# Patient Record
Sex: FEMALE | URBAN - METROPOLITAN AREA
[De-identification: names, ages, dates, MRNs, and addresses within clinical notes are randomized per-mention and may not be internally consistent; named-entity substitution may affect disease eponyms.]

---

## 2023-06-14 ENCOUNTER — APPOINTMENT (RX ONLY)
Dept: URBAN - METROPOLITAN AREA CLINIC 339 | Facility: CLINIC | Age: 38
Setting detail: DERMATOLOGY
End: 2023-06-14

## 2023-06-14 DIAGNOSIS — L24 IRRITANT CONTACT DERMATITIS: ICD-10-CM

## 2023-06-14 PROBLEM — L24.9 IRRITANT CONTACT DERMATITIS, UNSPECIFIED CAUSE: Status: ACTIVE | Noted: 2023-06-14

## 2023-06-14 PROCEDURE — ? MEDICATION COUNSELING

## 2023-06-14 PROCEDURE — ? PRESCRIPTION

## 2023-06-14 PROCEDURE — 99204 OFFICE O/P NEW MOD 45 MIN: CPT

## 2023-06-14 PROCEDURE — ? COUNSELING

## 2023-06-14 PROCEDURE — ? ADDITIONAL NOTES

## 2023-06-14 PROCEDURE — ? PRESCRIPTION MEDICATION MANAGEMENT

## 2023-06-14 RX ORDER — TRIAMCINOLONE ACETONIDE 1 MG/G
CREAM TOPICAL BID
Qty: 80 | Refills: 1 | Status: ERX | COMMUNITY
Start: 2023-06-14

## 2023-06-14 RX ADMIN — TRIAMCINOLONE ACETONIDE: 1 CREAM TOPICAL at 00:00

## 2023-06-14 ASSESSMENT — LOCATION SIMPLE DESCRIPTION DERM
LOCATION SIMPLE: LEFT ANTERIOR NECK
LOCATION SIMPLE: RIGHT ANTERIOR NECK
LOCATION SIMPLE: RIGHT FOREARM
LOCATION SIMPLE: LEFT FOREARM

## 2023-06-14 ASSESSMENT — LOCATION DETAILED DESCRIPTION DERM
LOCATION DETAILED: LEFT INFERIOR LATERAL NECK
LOCATION DETAILED: RIGHT INFERIOR LATERAL NECK
LOCATION DETAILED: RIGHT DISTAL DORSAL FOREARM
LOCATION DETAILED: LEFT DISTAL DORSAL FOREARM

## 2023-06-14 ASSESSMENT — LOCATION ZONE DERM
LOCATION ZONE: NECK
LOCATION ZONE: ARM

## 2023-06-14 ASSESSMENT — BSA RASH: BSA RASH: 2

## 2023-06-14 ASSESSMENT — ITCH NUMERIC RATING SCALE: HOW SEVERE IS YOUR ITCHING?: 4

## 2023-06-14 NOTE — PROCEDURE: MEDICATION COUNSELING
DISCHARGE SUMMARY    PRINCIPAL ADMITTED DIAGNOSIS:total abdominal hysterectomy, retropubic mid urethral sling    PRINCIPAL DISCHARGE DIAGNOSIS: total abdominal hysterectomy , retropubic mid urethral sling, incidental appendectomy    REASON FOR ADMISSION: Patient admitted for above surgical proceedure    Past Medical History:   Diagnosis Date     Abnormal maternal glucose tolerance, complicating pregnancy, childbirth, or the puerperium, unspecified as to episode of care      Allergic rhinitis, cause unspecified     Allergic rhinitis     Anxiety      Attention deficit disorder without mention of hyperactivity     psychiatrist     Breast cancer (H)     left     Depression      Essential hypertension      Insomnia, unspecified      Obesity      Type 2 diabetes mellitus without complication (H)           Additional diagnoses and complications which pertain to this admission: none     Operative procedures: total abdominal hysterectomy, retropubic mid urethral sling, appendectomy    SIGNIFICANT FINDINGS:  none   Hemoglobin   Date Value Ref Range Status   01/29/2021 12.9 11.7 - 15.7 g/dL Final       HOSPITAL COURSE:  Routine post operative course.  Pain management adequate, discharge on post op day #3    Diet: Regular     Activity: lifting restricted to 10 lbs, nothing in the vagina for 6 weeks    DISCHARGE INSTRUCTIONS:   [unfilled]    Follow-up: in 2 weeks     Condition at Discharge:    Stable    Total time spent on discharge: 20 minutes    Chelsi Anderson MD  1/31/2021, 7:56 AM  Pager: [phone]       Calcipotriene Counseling:  I discussed with the patient the risks of calcipotriene including but not limited to erythema, scaling, itching, and irritation.

## 2023-06-14 NOTE — PROCEDURE: PRESCRIPTION MEDICATION MANAGEMENT
Render In Strict Bullet Format?: No
Continue Regimen: eucerin
Discontinue Regimen: aloe vera
Detail Level: Zone
Initiate Treatment: triamcinolone acetonide 0.1 % topical cream Bid

## 2023-06-14 NOTE — HPI: RASH
What Type Of Note Output Would You Prefer (Optional)?: Bullet Format
How Severe Is Your Rash?: mild
Is This A New Presentation, Or A Follow-Up?: Rash
Additional History: No history of eczema, patient states she will itch more after sweating. She has only used otc medicated lotions and aloe era gel

## 2023-06-14 NOTE — PROCEDURE: MEDICATION COUNSELING
Telephone Encounter by Karena Foss RN at 01/03/17 03:57 PM     Author:  Karena Foss RN Service:  (none) Author Type:  Registered Nurse     Filed:  01/03/17 03:57 PM Encounter Date:  1/3/2017 Status:  Signed     :  Karena Foss RN (Registered Nurse)            Drops were ordered by Dr. Cottrell.  Patient was called to notify.[DM1.1M]      Revision History        User Key Date/Time User Provider Type Action    > DM1.1 01/03/17 03:57 PM Karena Foss RN Registered Nurse Sign    M - Manual             Sski Pregnancy And Lactation Text: This medication is Pregnancy Category D and isn't considered safe during pregnancy. It is excreted in breast milk.

## 2023-06-14 NOTE — PROCEDURE: ADDITIONAL NOTES
Render Risk Assessment In Note?: no
Detail Level: Simple
Additional Notes: Patient should use the steroid the apply the otc cream.

## 2023-06-14 NOTE — PROCEDURE: MEDICATION COUNSELING
Event Note High Dose Vitamin A Pregnancy And Lactation Text: High dose vitamin A therapy is contraindicated during pregnancy and breast feeding.

## 2023-06-14 NOTE — PROCEDURE: MEDICATION COUNSELING
I tried to call patient  I left a voicemail that I will try to reach her tomorrow  Rinvoq Pregnancy And Lactation Text: Based on animal studies, Rinvoq may cause embryo-fetal harm when administered to pregnant women.  The medication should not be used in pregnancy.  Breastfeeding is not recommended during treatment and for 6 days after the last dose.